# Patient Record
Sex: MALE | Race: BLACK OR AFRICAN AMERICAN | URBAN - METROPOLITAN AREA
[De-identification: names, ages, dates, MRNs, and addresses within clinical notes are randomized per-mention and may not be internally consistent; named-entity substitution may affect disease eponyms.]

---

## 2020-01-20 ENCOUNTER — HISTORICAL (OUTPATIENT)
Dept: SURGERY | Facility: HOSPITAL | Age: 40
End: 2020-01-20

## 2022-04-30 NOTE — OP NOTE
Patient:   Kirit Pollock             MRN: [id number]            FIN: [id number]               Age:   40 years     Sex:  Male     :  1980   Associated Diagnoses:   None   Author:   Pearl OLIVAS, Lucian JENKINS      Myringotomy With Tubes Bilateral  Adenoidectomy     PREOP DIAGNOSIS:     Turbinate hypertrophy, Adenoid hypertrophy    POSTOP DIAGNOSIS:    Same    PROCEDURE PERFORMED:  bilateral turbinate cauterization,  Adenoidectomy     ESTIMATED BLOOD LOSS:  Less than 5 ml    COMPLICATIONS:     None     SURGEON:       Lucian Kang MD    INTRA OPERATIVE FINDINGS: Large adenoids and turbinates      The patient's parents understood the risks, benefits and alternatives to the procedure, and consented to it.  The correct patient procedure and site were identified. Anesthesia was induced with no complication.    Turbinates were reduced with submucosal bipolar cautery, and outfractured.     The adenoids were exposed through the Davie-Luis mouth gag.  Appropriate positioning and safety precautions were observed.  The adenoids were curetted out.  A ridge of tissue was left at the superior constrictor.    The contralateral side was treated in the same fashion.  The patient was awakened and brought to recovery in good condition.

## 2022-04-30 NOTE — H&P
DATE OF PROCEDURE:  01/20/2020.    PREOP DIAGNOSES:    1. Adenoid hypertrophy.  2. Turbinate hypertrophy.  3. Nasal obstruction.    HISTORY OF PRESENT ILLNESS:  Mr. Kirit Pollock is a 40-year-old gentleman who has a history of difficulty breathing through the nose.  He has had no relief with Flonase or antibiotics.  He has postnasal drip and cough.  He has a history of being treated for this with a variety of antibiotics including Omnicef, prednisone, as well as the above-mentioned Flonase, as well as Astelin and Zyrtec and Allegra and Zithromax as another antibiotic.    ALLERGIES:  No known drug allergies.    PAST SURGICAL HISTORY:  Back surgery, hernia surgery.    CURRENT MEDICATIONS:    1. Metoprolol.  2. Lisinopril.    PAST MEDICAL HISTORY:  Hypertension.    SOCIAL HISTORY:  He is an occasional smoker but a nondrinker.    REVIEW OF SYSTEMS:  NEURO:  No seizures.  No headaches.   GENERAL:  Some cough as noted above.  No sweats or bleeding disorders.   GASTROINTESTINAL:  Good appetite.   SKIN:  No hives, warts or scars.    FAMILY HISTORY:  Negative for bleeding, tuberculosis, diabetes, asthma, cancer.    PHYSICAL EXAM:  NECK:  No masses.   ENDOCRINE:  Normal thyroid.   NASAL:  Nasal septal deviation is considerable and reduces nasal airflow by 80% to the left side.  The turbinates are large bilaterally, further reducing the nasal cross section and increasing resistance.     ORAL CAVITY:  Oropharynx shows normal mucosa with a moderately large base of tongue.   NEUROLOGIC:  Cranial nerves 2 through 12 grossly intact.     LYMPHATICS:  No lymphadenopathy.   EARS:  Normal tympanic membrane and middle ear.   WEIGHT:  245.    ASSESSMENT:    1. Inferior turbinate hypertrophy.  2. Nasal obstruction.  3. Adenoid hypertrophy.    PLAN:  We discussed the risks, benefits, and alternatives at length in layman's terms.  We discussed the rare but serious things that could occur as well as more common complications and  expectations. Consent obtained.  All questions answered.  Proceed on 01/20 with turbinate reduction, adenoidectomy with possible septoplasty.        ______________________________  Quansurindermadhu Kang MD    CG/UF  DD:  01/19/2020  Time:  07:21PM  DT:  01/19/2020  Time:  07:41PM  Job #:  [id number]    The H&P was reviewed, the patient was examined, and the following changes to the patients condition are noted:  ______________________________________________________________________________  ______________________________________________________________________________  ______________________________________________________________________________  [  ] No changes to the patient's condition:      ______________________________                                             ___________________  PHYSICIAN SIGNATURE                                                             DATE/TIME